# Patient Record
Sex: FEMALE | Race: WHITE | URBAN - METROPOLITAN AREA
[De-identification: names, ages, dates, MRNs, and addresses within clinical notes are randomized per-mention and may not be internally consistent; named-entity substitution may affect disease eponyms.]

---

## 2022-12-02 ENCOUNTER — OFFICE VISIT (OUTPATIENT)
Dept: URBAN - METROPOLITAN AREA CLINIC 59 | Facility: CLINIC | Age: 67
End: 2022-12-02
Payer: COMMERCIAL

## 2022-12-02 DIAGNOSIS — H43.822 VITREOMACULAR ADHESION, LEFT EYE: ICD-10-CM

## 2022-12-02 DIAGNOSIS — H25.13 AGE-RELATED NUCLEAR CATARACT, BILATERAL: ICD-10-CM

## 2022-12-02 DIAGNOSIS — H35.363 DRUSEN (DEGENERATIVE) OF MACULA, BILATERAL: ICD-10-CM

## 2022-12-02 DIAGNOSIS — H43.811 VITREOUS DEGENERATION, RIGHT EYE: Primary | ICD-10-CM

## 2022-12-02 PROCEDURE — 92004 COMPRE OPH EXAM NEW PT 1/>: CPT | Performed by: OPTOMETRIST

## 2022-12-02 PROCEDURE — 92134 CPTRZ OPH DX IMG PST SGM RTA: CPT | Performed by: OPTOMETRIST

## 2022-12-02 ASSESSMENT — INTRAOCULAR PRESSURE
OS: 20
OD: 20

## 2022-12-02 NOTE — IMPRESSION/PLAN
Impression: Vitreous degeneration, right eye: H43.811. Plan: MAC OCT done today. Patient educated on findings. Symptoms of retinal detachment or tears were discussed in detail. If patient notices any symptoms discussed, contact office ASAP. Patient to RTC in 1 month for PCD follow up. Dilate OD only. MAC OCT
 OD: PVD OS: VMA

## 2022-12-02 NOTE — IMPRESSION/PLAN
Impression: Drusen (degenerative) of macula, bilateral: H35.363. Plan: Patient educated on findings, monitor.

## 2023-01-13 ENCOUNTER — OFFICE VISIT (OUTPATIENT)
Dept: URBAN - METROPOLITAN AREA CLINIC 59 | Facility: CLINIC | Age: 68
End: 2023-01-13
Payer: COMMERCIAL

## 2023-01-13 DIAGNOSIS — H35.363 DRUSEN (DEGENERATIVE) OF MACULA, BILATERAL: ICD-10-CM

## 2023-01-13 DIAGNOSIS — H43.811 VITREOUS DEGENERATION, RIGHT EYE: Primary | ICD-10-CM

## 2023-01-13 PROCEDURE — 92014 COMPRE OPH EXAM EST PT 1/>: CPT | Performed by: OPTOMETRIST

## 2023-01-13 ASSESSMENT — INTRAOCULAR PRESSURE
OS: 18
OD: 18

## 2023-01-13 NOTE — IMPRESSION/PLAN
Impression: Drusen (degenerative) of macula, bilateral: H35.363. Plan: Patient educated on findings, monitor. Patient started taking Ocuvite - okay to continue.

## 2023-01-13 NOTE — IMPRESSION/PLAN
Impression: Vitreous degeneration, right eye: H43.811. Plan: Stable. Patient educated on findings. Symptoms of retinal detachment or tears were discussed in detail. If patient notices any symptoms discussed, contact office ASAP.